# Patient Record
Sex: MALE | Race: WHITE | NOT HISPANIC OR LATINO | Employment: UNEMPLOYED | URBAN - NONMETROPOLITAN AREA
[De-identification: names, ages, dates, MRNs, and addresses within clinical notes are randomized per-mention and may not be internally consistent; named-entity substitution may affect disease eponyms.]

---

## 2017-02-03 ENCOUNTER — OFFICE VISIT (OUTPATIENT)
Dept: INTERNAL MEDICINE | Facility: CLINIC | Age: 37
End: 2017-02-03

## 2017-02-03 VITALS
WEIGHT: 197.6 LBS | DIASTOLIC BLOOD PRESSURE: 70 MMHG | HEART RATE: 95 BPM | RESPIRATION RATE: 16 BRPM | HEIGHT: 72 IN | SYSTOLIC BLOOD PRESSURE: 122 MMHG | BODY MASS INDEX: 26.76 KG/M2 | OXYGEN SATURATION: 97 % | TEMPERATURE: 99.1 F

## 2017-02-03 DIAGNOSIS — G43.109 MIGRAINE WITH AURA AND WITHOUT STATUS MIGRAINOSUS, NOT INTRACTABLE: ICD-10-CM

## 2017-02-03 DIAGNOSIS — F32.1 MODERATE SINGLE CURRENT EPISODE OF MAJOR DEPRESSIVE DISORDER (HCC): Primary | ICD-10-CM

## 2017-02-03 DIAGNOSIS — Z00.00 HEALTHCARE MAINTENANCE: ICD-10-CM

## 2017-02-03 DIAGNOSIS — Z30.09 VASECTOMY EVALUATION: ICD-10-CM

## 2017-02-03 PROCEDURE — 99204 OFFICE O/P NEW MOD 45 MIN: CPT | Performed by: FAMILY MEDICINE

## 2017-02-03 NOTE — PROGRESS NOTES
Subjective   Lazaro Barry is a 36 y.o. male who presents to establish care with me.    Chief Complaint:    Depression  Patient complains of depression. He complains of depressed mood, insomnia, psychomotor retardation, feelings of worthlessness/guilt, difficulty concentrating, hopelessness, suicidal thoughts without plan, anxiety and disturbed sleep. Onset was approximately 4 months ago. Symptoms have been gradually worsening since that time.  Patient denies anhedonia, hypersomnia, psychomotor agitation, recurrent thoughts of death, suicidal thoughts with specific plan, suicidal attempt, panic attacks, weight loss and weight gain. Family history significant for depression and substance abuse. Possible organic causes contributing are: none. Risk factors: negative life event job loss - 8/2016. Previous treatment includes panic attack treatment. He complains of the following side effects from the treatment: agitation.    Migraine: + aura/blurred vision, 2 x monthly, not sure what triggers them.          The following portions of the patient's history were reviewed and updated as appropriate: He  has a past medical history of Hemorrhoid.  He has Moderate single current episode of major depressive disorder and Migraine with aura and without status migrainosus, not intractable on his pertinent problem list.  He  has a past surgical history that includes Cyst Removal (1982); [place] tooth extraction; and Club foot release (1982).  His family history includes Alcohol abuse in his brother, brother, father, and sister; Alzheimer's disease in his maternal grandmother; Depression in his mother; Liver disease in his father; Migraines in his brother; Thyroid disease in his mother.  He  reports that he has never smoked. He has never used smokeless tobacco. He reports that he drinks alcohol. He reports that he does not use illicit drugs..    Review of Systems  Review of Systems   Constitutional: Negative for activity  "change, appetite change, chills, fatigue, fever and unexpected weight change.        No malaise   HENT: Negative for ear pain, hearing loss, nosebleeds, rhinorrhea, sore throat, trouble swallowing and voice change.    Eyes: Negative for pain, discharge, redness, itching and visual disturbance.        No dry eyes   Respiratory: Negative for cough, chest tightness, shortness of breath and wheezing.         No SOB with exertion  No SOB lying down   Cardiovascular: Negative for chest pain, palpitations and leg swelling.        No leg cramps   Gastrointestinal: Negative for abdominal pain, blood in stool, constipation, diarrhea, nausea and vomiting.        No frequent heartburn  No change in bowel habits   Endocrine: Negative for cold intolerance, heat intolerance, polydipsia, polyphagia and polyuria.        No Muscle weakness  No feeling of weakness  No Hot flashes   Genitourinary: Negative for decreased urine volume, difficulty urinating, discharge, dysuria, frequency, hematuria, penile pain, testicular pain and urgency.        No lump on testicles   Musculoskeletal: Negative for arthralgias, gait problem, joint swelling and myalgias.        No limb pain  No limb swelling   Skin: Negative for rash and wound.        No rash  No lesions  No Itching  No change in mole   Neurological: Negative for dizziness, tremors, seizures, syncope, weakness, numbness and headaches.        No trouble walking  No confusion  No tingling       Hematological: Negative for adenopathy. Does not bruise/bleed easily.   Psychiatric/Behavioral: Positive for dysphoric mood and sleep disturbance. Negative for suicidal ideas. The patient is not nervous/anxious.         No change in personality         Objective    Visit Vitals   • /70   • Pulse 95   • Temp 99.1 °F (37.3 °C) (Temporal Artery )   • Resp 16   • Ht 72\" (182.9 cm)   • Wt 197 lb 9.6 oz (89.6 kg)   • SpO2 97%   • BMI 26.8 kg/m2     Physical Exam   Constitutional: He is oriented to " person, place, and time. He appears well-developed and well-nourished. No distress.   HENT:   Head: Normocephalic and atraumatic.   Right Ear: Tympanic membrane, external ear and ear canal normal.   Left Ear: Tympanic membrane, external ear and ear canal normal.   Nose: No rhinorrhea.   Mouth/Throat: Uvula is midline, oropharynx is clear and moist and mucous membranes are normal. No posterior oropharyngeal erythema. No tonsillar exudate.   Eyes: Conjunctivae and lids are normal. Pupils are equal, round, and reactive to light. Right eye exhibits no discharge. Left eye exhibits no discharge. No scleral icterus.   Neck: Trachea normal, normal range of motion and phonation normal. Neck supple. No thyroid mass and no thyromegaly present.   Cardiovascular: Normal rate, regular rhythm and normal heart sounds.    No murmur heard.  Pulmonary/Chest: Effort normal and breath sounds normal.   Abdominal: Soft. Normal appearance. There is no splenomegaly or hepatomegaly. There is no tenderness. No hernia.   Musculoskeletal: Normal range of motion. He exhibits no edema, tenderness or deformity.   Lymphadenopathy:        Head (right side): No submental, no submandibular, no preauricular and no posterior auricular adenopathy present.        Head (left side): No submental, no submandibular, no preauricular and no posterior auricular adenopathy present.     He has no cervical adenopathy.        Right: No supraclavicular adenopathy present.        Left: No supraclavicular adenopathy present.   Neurological: He is alert and oriented to person, place, and time. He has normal strength.   Reflex Scores:       Patellar reflexes are 2+ on the right side and 2+ on the left side.  Skin: Skin is warm and dry. No rash noted. No pallor.   Psychiatric: His behavior is normal. Judgment and thought content normal. He exhibits a depressed mood.         Assessment/Plan     1. Moderate single current episode of major depressive disorder  Worsening, pt  to find out what his mother has taken and we will start that.      2. Vasectomy evaluation    - Ambulatory Referral to Urology    3. Healthcare maintenance    - Comprehensive Metabolic Panel; Future  - Lipid Panel; Future    4. Migraine with aura and without status migrainosus, not intractable  stable         I, Vaishali Peter MD, hereby attest that the medical record entry above accurately reflects signatures/notations that I made in my capacity as Vaishali Peter MD when I treated and diagnosed the above patient.  I do hereby attest that his information is true, accurate, and complete to the best of my knowledge and I understand that any falsification, omission, or concealment of material fact may subject me to administrative, civil, or criminal liability.

## 2017-02-17 ENCOUNTER — PATIENT MESSAGE (OUTPATIENT)
Dept: INTERNAL MEDICINE | Facility: CLINIC | Age: 37
End: 2017-02-17

## 2017-02-21 NOTE — TELEPHONE ENCOUNTER
From: Lazaro Barry  To: Vaishali Peter MD  Sent: 2/17/2017 1:08 PM EST  Subject: Visit Follow-Up Question    Sorry it took so long, I had to reach my mom and then she had to reach my aunt so that I could get response.    My mother is not currently on any antidepressants and she does not remember the name of them when she did take them. My aunt however does take them. She has taken Prozak, Zoloft, and Effexor. She is currently on Zoloft. She has also taken Pristiq and said it has worked the best but was too expensive.    I was also wondering if anything can be done about mole removal?    Thanks,    Lazaro Smith

## 2017-02-24 ENCOUNTER — LAB (OUTPATIENT)
Dept: INTERNAL MEDICINE | Facility: CLINIC | Age: 37
End: 2017-02-24

## 2017-02-24 DIAGNOSIS — Z00.00 HEALTHCARE MAINTENANCE: ICD-10-CM

## 2017-02-24 LAB
ALBUMIN SERPL-MCNC: 4.4 G/DL (ref 3.2–4.8)
ALBUMIN/GLOB SERPL: 1.4 G/DL (ref 1.5–2.5)
ALP SERPL-CCNC: 69 U/L (ref 25–100)
ALT SERPL-CCNC: 40 U/L (ref 7–40)
AST SERPL-CCNC: 28 U/L (ref 0–33)
BILIRUB SERPL-MCNC: 0.4 MG/DL (ref 0.3–1.2)
BUN SERPL-MCNC: 12 MG/DL (ref 9–23)
BUN/CREAT SERPL: 13.3 (ref 7–25)
CALCIUM SERPL-MCNC: 9.7 MG/DL (ref 8.7–10.4)
CHLORIDE SERPL-SCNC: 104 MMOL/L (ref 99–109)
CHOLEST SERPL-MCNC: 203 MG/DL (ref 0–200)
CO2 SERPL-SCNC: 24 MMOL/L (ref 20–31)
CREAT SERPL-MCNC: 0.9 MG/DL (ref 0.6–1.3)
GLOBULIN SER CALC-MCNC: 3.2 GM/DL
GLUCOSE SERPL-MCNC: 83 MG/DL (ref 70–100)
HDLC SERPL-MCNC: 46 MG/DL (ref 40–60)
LDLC SERPL CALC-MCNC: 136 MG/DL (ref 0–100)
POTASSIUM SERPL-SCNC: 4.4 MMOL/L (ref 3.5–5.5)
PROT SERPL-MCNC: 7.6 G/DL (ref 5.7–8.2)
SODIUM SERPL-SCNC: 139 MMOL/L (ref 132–146)
TRIGL SERPL-MCNC: 107 MG/DL (ref 0–150)
VLDLC SERPL CALC-MCNC: 21.4 MG/DL

## 2017-02-24 PROCEDURE — 36415 COLL VENOUS BLD VENIPUNCTURE: CPT | Performed by: FAMILY MEDICINE

## 2017-03-03 ENCOUNTER — OFFICE VISIT (OUTPATIENT)
Dept: INTERNAL MEDICINE | Facility: CLINIC | Age: 37
End: 2017-03-03

## 2017-03-03 VITALS
HEIGHT: 72 IN | BODY MASS INDEX: 26.66 KG/M2 | DIASTOLIC BLOOD PRESSURE: 80 MMHG | HEART RATE: 77 BPM | OXYGEN SATURATION: 98 % | SYSTOLIC BLOOD PRESSURE: 110 MMHG | RESPIRATION RATE: 12 BRPM | WEIGHT: 196.8 LBS

## 2017-03-03 DIAGNOSIS — F32.1 MODERATE SINGLE CURRENT EPISODE OF MAJOR DEPRESSIVE DISORDER (HCC): Primary | ICD-10-CM

## 2017-03-03 PROCEDURE — 99213 OFFICE O/P EST LOW 20 MIN: CPT | Performed by: FAMILY MEDICINE

## 2017-03-03 NOTE — PROGRESS NOTES
Follow up visit after starting Zoloft. He feels like he would be better not on it. Causing nausea, and not really helping with depression.     SUBJECTIVE: Lazaro Barry is a 36 y.o. male seen for a follow up visit;        Subjective   Lazaro Barry is a 36 y.o. male who presents for follow up of depression.  At his last visit anti-depressant medication was started, but not until about 2 weeks ago.   He complains of the following side effects from the treatment: none.  Symptoms have been unchanged since starting treatment.  Depression symptoms still include depressed mood, anhedonia, hypersomnia, psychomotor retardation, fatigue and difficulty concentrating. Symptoms that have resolved include: feelings of worthlessness/guilt.  Patient denies suicidal thoughts without plan and suicidal thoughts with specific plan.  Previous treatment includes: none.          The following portions of the patient's history were reviewed and updated as appropriate: He  has a past medical history of Hemorrhoid.  He has Moderate single current episode of major depressive disorder and Migraine with aura and without status migrainosus, not intractable on his pertinent problem list.  He  has a past surgical history that includes Cyst Removal (1982); [place] tooth extraction; and Club foot release (1982).  His family history includes Alcohol abuse in his brother, brother, father, and sister; Alzheimer's disease in his maternal grandmother; Depression in his mother; Liver disease in his father; Migraines in his brother; Thyroid disease in his mother.  He  reports that he has never smoked. He has never used smokeless tobacco. He reports that he drinks alcohol. He reports that he does not use illicit drugs..    Review of Systems   Constitutional: Negative.    Psychiatric/Behavioral: Positive for dysphoric mood. Negative for sleep disturbance and suicidal ideas. The patient is not nervous/anxious.          OBJECTIVE:  Visit Vitals  "  • /80   • Pulse 77   • Resp 12   • Ht 72\" (182.9 cm)   • Wt 196 lb 12.8 oz (89.3 kg)   • SpO2 98%   • BMI 26.69 kg/m2        Physical Exam   Constitutional: He appears well-developed and well-nourished. No distress.   Psychiatric: His speech is normal and behavior is normal. Thought content normal. He exhibits a depressed mood.   Flat affect           ASSESSMENT:  1. Moderate single current episode of major depressive disorder  No improvement.      Will continue zoloft at current dose x 2 more weeks.  If improving a lot, will continue current dose; if improving a bit, will increase; if no improvement will switch to lexapro.          "

## 2017-03-30 ENCOUNTER — PATIENT MESSAGE (OUTPATIENT)
Dept: INTERNAL MEDICINE | Facility: CLINIC | Age: 37
End: 2017-03-30

## 2017-03-30 DIAGNOSIS — F32.1 MODERATE SINGLE CURRENT EPISODE OF MAJOR DEPRESSIVE DISORDER (HCC): Primary | ICD-10-CM

## 2017-03-30 RX ORDER — ESCITALOPRAM OXALATE 10 MG/1
10 TABLET ORAL DAILY
Qty: 30 TABLET | Refills: 1 | Status: SHIPPED | OUTPATIENT
Start: 2017-03-30 | End: 2017-05-09

## 2017-03-30 NOTE — TELEPHONE ENCOUNTER
Johanna Wolf MA 3/30/2017 10:41 AM EDT        ----- Message -----   From: Lazaro Barry   Sent: 3/30/2017 7:17 AM   To: Easton Braxton Magallon Mr Clinical Pool  Subject: Prescription Question     I will need a fresh set of pills tomorrow but I would like to try the other drug you suggested instead of a refill of the Zoloft. Though I've been able to work with the fatigue and nausea, I don't like the other effects it's having on me. It makes it where things don't bother me like they used to, but I also don't get excited either, and my wife says I've been more cold and distant than normal. And it's also hurt our sex life in that it's harder to orgasm.     Thanks,    Lazaro

## 2017-05-09 ENCOUNTER — OFFICE VISIT (OUTPATIENT)
Dept: INTERNAL MEDICINE | Facility: CLINIC | Age: 37
End: 2017-05-09

## 2017-05-09 VITALS
HEIGHT: 72 IN | RESPIRATION RATE: 12 BRPM | WEIGHT: 197.4 LBS | SYSTOLIC BLOOD PRESSURE: 118 MMHG | DIASTOLIC BLOOD PRESSURE: 70 MMHG | BODY MASS INDEX: 26.74 KG/M2 | OXYGEN SATURATION: 98 % | HEART RATE: 79 BPM

## 2017-05-09 DIAGNOSIS — F32.1 MODERATE SINGLE CURRENT EPISODE OF MAJOR DEPRESSIVE DISORDER (HCC): Primary | ICD-10-CM

## 2017-05-09 PROCEDURE — 99213 OFFICE O/P EST LOW 20 MIN: CPT | Performed by: FAMILY MEDICINE

## 2017-05-09 RX ORDER — VENLAFAXINE HYDROCHLORIDE 37.5 MG/1
37.5 CAPSULE, EXTENDED RELEASE ORAL DAILY
Qty: 30 CAPSULE | Refills: 0 | Status: SHIPPED | OUTPATIENT
Start: 2017-05-09 | End: 2017-06-16 | Stop reason: SDUPTHER

## 2017-05-29 ENCOUNTER — PATIENT MESSAGE (OUTPATIENT)
Dept: INTERNAL MEDICINE | Facility: CLINIC | Age: 37
End: 2017-05-29

## 2017-05-29 DIAGNOSIS — F32.1 MODERATE SINGLE CURRENT EPISODE OF MAJOR DEPRESSIVE DISORDER (HCC): ICD-10-CM

## 2017-06-16 RX ORDER — VENLAFAXINE HYDROCHLORIDE 75 MG/1
75 CAPSULE, EXTENDED RELEASE ORAL DAILY
Qty: 30 CAPSULE | Refills: 1 | Status: SHIPPED | OUTPATIENT
Start: 2017-06-16 | End: 2017-07-17 | Stop reason: SDUPTHER

## 2017-06-16 NOTE — TELEPHONE ENCOUNTER
From: Lazaro Barry  To: Vaishali Peter MD  Sent: 5/29/2017 7:17 PM EDT  Subject: Prescription Question    I think we need to change the drug again unfortunately. The drug had me feeling great and much better than the other two, but it too is hurting our sex life by preventing orgasms.    Thanks,    Lazaro

## 2017-07-17 ENCOUNTER — PATIENT MESSAGE (OUTPATIENT)
Dept: INTERNAL MEDICINE | Facility: CLINIC | Age: 37
End: 2017-07-17

## 2017-07-17 DIAGNOSIS — F32.1 MODERATE SINGLE CURRENT EPISODE OF MAJOR DEPRESSIVE DISORDER (HCC): ICD-10-CM

## 2017-07-17 RX ORDER — VENLAFAXINE HYDROCHLORIDE 75 MG/1
75 CAPSULE, EXTENDED RELEASE ORAL DAILY
Qty: 30 CAPSULE | Refills: 4 | Status: SHIPPED | OUTPATIENT
Start: 2017-07-17 | End: 2017-07-18

## 2017-07-17 NOTE — TELEPHONE ENCOUNTER
From: Lazaro Barry  Sent: 7/17/2017 6:31 PM EDT  To: Vaishali Peter MD  Subject: RE: Prescription Question    Outside of that it's much better. I'm more motivated and able to get things done while the suicidal thoughts are completely gone and the down time isn't as strong or as long.  ----- Message -----  From: Vaishali Peter MD  Sent: 7/17/2017 6:19 PM EDT  To: Lazaro Barry  Subject: RE: Prescription Question    Side effects aside, is your depression better? Worse? Unchanged? This medicine is the least likely to cause those side effects so any switch will likely be worse.       ----- Message -----   From: Lazaro Barry   Sent: 7/17/2017 10:59 AM EDT   To: Vaishali Peter MD  Subject: Prescription Question    I've been on the higher dosage for about a month now and still having the same sexual side effects. I'm also running low on the pills so I will need a refill or try something else.    ThanksLazaro  ----- Message -----  From: Vaishali Peter MD  Sent: 6/16/2017 6:51 PM EDT  To: Lazaro Barry  Subject: RE: Prescription Question    Ok, I sent in an increased dose. We will hold here and make sure your side effects stabilize or improve before we increase. The 75 mg may be an effective dose for you though.     ----- Message -----   From: Lazaro Barry   Sent: 6/13/2017 3:46 PM EDT   To: Vaishali Peter MD  Subject: RE: Prescription Question    I need a refill but you mentioned during the last appointment that you intended to increase the dosage when I ran out.    Lazaro Venegas  ----- Message -----  From: Vaishali Peter MD  Sent: 6/5/2017 2:22 PM EDT  To: Lazaro Barry  Subject: RE: Prescription Question    This is the best one at not causing that side effect. There aren't any others. Sometimes it makes them more difficult but typically not impossible.       ----- Message -----   From: Lazaro Barry   Sent: 5/29/2017 7:17 PM EDT   To: Vaishali Peter MD  Subject:  Prescription Question    I think we need to change the drug again unfortunately. The drug had me feeling great and much better than the other two, but it too is hurting our sex life by preventing orgasms.    Thanks,    Lazaro

## 2017-07-18 RX ORDER — VENLAFAXINE HYDROCHLORIDE 37.5 MG/1
37.5 CAPSULE, EXTENDED RELEASE ORAL DAILY
Qty: 30 CAPSULE | Refills: 2 | Status: SHIPPED | OUTPATIENT
Start: 2017-07-18 | End: 2017-08-11 | Stop reason: SDUPTHER

## 2017-07-18 NOTE — TELEPHONE ENCOUNTER
From: Lazaro Barry  To: Vaishali Peter MD  Sent: 7/17/2017 6:52 PM EDT  Subject: Prescription Question    I think I would like to go down to the previous dosage because I was eventually able to overcome the side effects, but it's much more difficult with this dosage, and the depression and anxiety levels were still fine and better than when I was untreated.  ----- Message -----  From: Vaishali Peter MD  Sent: 7/17/2017 6:19 PM EDT  To: Lazaro Barry  Subject: RE: Prescription Question    Side effects aside, is your depression better? Worse? Unchanged? This medicine is the least likely to cause those side effects so any switch will likely be worse.       ----- Message -----   From: Lazaro Barry   Sent: 7/17/2017 10:59 AM EDT   To: Vaishali Peter MD  Subject: Prescription Question    I've been on the higher dosage for about a month now and still having the same sexual side effects. I'm also running low on the pills so I will need a refill or try something else.    ThanksLazaro  ----- Message -----  From: Vaishali Peter MD  Sent: 6/16/2017 6:51 PM EDT  To: Lazaro Barry  Subject: RE: Prescription Question    Ok, I sent in an increased dose. We will hold here and make sure your side effects stabilize or improve before we increase. The 75 mg may be an effective dose for you though.     ----- Message -----   From: Lazaro Barry   Sent: 6/13/2017 3:46 PM EDT   To: Vaishali Peter MD  Subject: RE: Prescription Question    I need a refill but you mentioned during the last appointment that you intended to increase the dosage when I ran out.    ThanksLazaro  ----- Message -----  From: Vaishali Peter MD  Sent: 6/5/2017 2:22 PM EDT  To: Lazaro Barry  Subject: RE: Prescription Question    This is the best one at not causing that side effect. There aren't any others. Sometimes it makes them more difficult but typically not impossible.       ----- Message -----   From: Lazaro RESTREPO  Jhonny   Sent: 5/29/2017 7:17 PM EDT   To: Vaishali Peter MD  Subject: Prescription Question    I think we need to change the drug again unfortunately. The drug had me feeling great and much better than the other two, but it too is hurting our sex life by preventing orgasms.    Thanks,    Lazaro

## 2017-08-11 ENCOUNTER — OFFICE VISIT (OUTPATIENT)
Dept: INTERNAL MEDICINE | Facility: CLINIC | Age: 37
End: 2017-08-11

## 2017-08-11 VITALS
HEIGHT: 72 IN | RESPIRATION RATE: 12 BRPM | DIASTOLIC BLOOD PRESSURE: 80 MMHG | HEART RATE: 74 BPM | WEIGHT: 193.4 LBS | OXYGEN SATURATION: 98 % | BODY MASS INDEX: 26.19 KG/M2 | SYSTOLIC BLOOD PRESSURE: 114 MMHG

## 2017-08-11 DIAGNOSIS — F32.1 MODERATE SINGLE CURRENT EPISODE OF MAJOR DEPRESSIVE DISORDER (HCC): ICD-10-CM

## 2017-08-11 PROCEDURE — 99213 OFFICE O/P EST LOW 20 MIN: CPT | Performed by: FAMILY MEDICINE

## 2017-08-11 RX ORDER — VENLAFAXINE HYDROCHLORIDE 37.5 MG/1
37.5 CAPSULE, EXTENDED RELEASE ORAL DAILY
Qty: 30 CAPSULE | Refills: 2 | Status: SHIPPED | OUTPATIENT
Start: 2017-08-11 | End: 2018-01-19

## 2018-01-18 ENCOUNTER — PATIENT MESSAGE (OUTPATIENT)
Dept: INTERNAL MEDICINE | Facility: CLINIC | Age: 38
End: 2018-01-18

## 2018-01-18 DIAGNOSIS — F32.1 MODERATE SINGLE CURRENT EPISODE OF MAJOR DEPRESSIVE DISORDER (HCC): ICD-10-CM

## 2018-01-19 RX ORDER — BUPROPION HYDROCHLORIDE 150 MG/1
150 TABLET ORAL DAILY
Qty: 30 TABLET | Refills: 1 | Status: SHIPPED | OUTPATIENT
Start: 2018-01-19 | End: 2018-02-22 | Stop reason: SDUPTHER

## 2018-01-19 NOTE — TELEPHONE ENCOUNTER
From: Lazaro Barry  To: Vaishali Peter MD  Sent: 1/18/2018 12:15 PM EST  Subject: Prescription Question    I have tried and tried with this medication and I no longer want to keep trying. I am in the mood for sex more but I might as well not even try because I can't orgasm. I have tried not taking the drug at all and hope that I can cope with my issues without it and that is not the case. The depression isn't really there anymore but the anxiety of things can ruin what would normally have been a productive day. I'm aware that this medication is supposed to have the lowest chances of this kind of side effect but the lack of sex with my wife isn't worth it.    Thanks,    Lazaro Smith

## 2018-02-22 ENCOUNTER — PATIENT MESSAGE (OUTPATIENT)
Dept: INTERNAL MEDICINE | Facility: CLINIC | Age: 38
End: 2018-02-22

## 2018-02-22 DIAGNOSIS — F32.1 MODERATE SINGLE CURRENT EPISODE OF MAJOR DEPRESSIVE DISORDER (HCC): ICD-10-CM

## 2018-02-22 RX ORDER — BUPROPION HYDROCHLORIDE 150 MG/1
150 TABLET ORAL DAILY
Qty: 30 TABLET | Refills: 3 | Status: SHIPPED | OUTPATIENT
Start: 2018-02-22

## 2018-02-22 NOTE — TELEPHONE ENCOUNTER
From: Lazaro Barry  To: Vaishali Peter MD  Sent: 2/22/2018 9:04 AM EST  Subject: Prescription Question    I need a refill on the Wellbutrin 150mg please. The chart says I don't have any prescriptions. And can you set it to [facility] instead of [facility] as well please?    Thanks,    Lazaro